# Patient Record
Sex: FEMALE | Race: ASIAN
[De-identification: names, ages, dates, MRNs, and addresses within clinical notes are randomized per-mention and may not be internally consistent; named-entity substitution may affect disease eponyms.]

---

## 2020-08-29 ENCOUNTER — HOSPITAL ENCOUNTER (EMERGENCY)
Dept: HOSPITAL 72 - EMR | Age: 25
Discharge: HOME | End: 2020-08-29
Payer: MEDICAID

## 2020-08-29 VITALS — SYSTOLIC BLOOD PRESSURE: 132 MMHG | DIASTOLIC BLOOD PRESSURE: 78 MMHG

## 2020-08-29 VITALS — BODY MASS INDEX: 44.2 KG/M2 | HEIGHT: 66 IN | WEIGHT: 275 LBS

## 2020-08-29 VITALS — SYSTOLIC BLOOD PRESSURE: 135 MMHG | DIASTOLIC BLOOD PRESSURE: 84 MMHG

## 2020-08-29 DIAGNOSIS — N39.0: Primary | ICD-10-CM

## 2020-08-29 DIAGNOSIS — N83.202: ICD-10-CM

## 2020-08-29 DIAGNOSIS — E66.9: ICD-10-CM

## 2020-08-29 DIAGNOSIS — D50.9: ICD-10-CM

## 2020-08-29 DIAGNOSIS — D72.829: ICD-10-CM

## 2020-08-29 DIAGNOSIS — Z90.49: ICD-10-CM

## 2020-08-29 LAB
ADD MANUAL DIFF: NO
ALBUMIN SERPL-MCNC: 3.4 G/DL (ref 3.4–5)
ALBUMIN/GLOB SERPL: 0.8 {RATIO} (ref 1–2.7)
ALP SERPL-CCNC: 69 U/L (ref 46–116)
ALT SERPL-CCNC: 22 U/L (ref 12–78)
ANION GAP SERPL CALC-SCNC: 9 MMOL/L (ref 5–15)
APPEARANCE UR: CLEAR
APTT PPP: (no result) S
AST SERPL-CCNC: 15 U/L (ref 15–37)
BASOPHILS NFR BLD AUTO: 0.6 % (ref 0–2)
BILIRUB SERPL-MCNC: 0.1 MG/DL (ref 0.2–1)
BUN SERPL-MCNC: 12 MG/DL (ref 7–18)
CALCIUM SERPL-MCNC: 8.9 MG/DL (ref 8.5–10.1)
CHLORIDE SERPL-SCNC: 104 MMOL/L (ref 98–107)
CO2 SERPL-SCNC: 25 MMOL/L (ref 21–32)
CREAT SERPL-MCNC: 0.6 MG/DL (ref 0.55–1.3)
EOSINOPHIL NFR BLD AUTO: 2.2 % (ref 0–3)
ERYTHROCYTE [DISTWIDTH] IN BLOOD BY AUTOMATED COUNT: 14.3 % (ref 11.6–14.8)
GLOBULIN SER-MCNC: 4.3 G/DL
GLUCOSE UR STRIP-MCNC: NEGATIVE MG/DL
HCT VFR BLD CALC: 36.4 % (ref 37–47)
HGB BLD-MCNC: 11.7 G/DL (ref 12–16)
KETONES UR QL STRIP: NEGATIVE
LEUKOCYTE ESTERASE UR QL STRIP: (no result)
LYMPHOCYTES NFR BLD AUTO: 24.8 % (ref 20–45)
MCV RBC AUTO: 79 FL (ref 80–99)
MONOCYTES NFR BLD AUTO: 6.3 % (ref 1–10)
NEUTROPHILS NFR BLD AUTO: 66.1 % (ref 45–75)
NITRITE UR QL STRIP: NEGATIVE
PH UR STRIP: 8 [PH] (ref 4.5–8)
PLATELET # BLD: 350 K/UL (ref 150–450)
POTASSIUM SERPL-SCNC: 3.9 MMOL/L (ref 3.5–5.1)
PROT UR QL STRIP: NEGATIVE
RBC # BLD AUTO: 4.61 M/UL (ref 4.2–5.4)
SODIUM SERPL-SCNC: 138 MMOL/L (ref 136–145)
SP GR UR STRIP: 1.01 (ref 1–1.03)
UROBILINOGEN UR-MCNC: NORMAL MG/DL (ref 0–1)
WBC # BLD AUTO: 11.6 K/UL (ref 4.8–10.8)

## 2020-08-29 PROCEDURE — 85025 COMPLETE CBC W/AUTO DIFF WBC: CPT

## 2020-08-29 PROCEDURE — 36415 COLL VENOUS BLD VENIPUNCTURE: CPT

## 2020-08-29 PROCEDURE — 84702 CHORIONIC GONADOTROPIN TEST: CPT

## 2020-08-29 PROCEDURE — 80053 COMPREHEN METABOLIC PANEL: CPT

## 2020-08-29 PROCEDURE — 83690 ASSAY OF LIPASE: CPT

## 2020-08-29 PROCEDURE — 81025 URINE PREGNANCY TEST: CPT

## 2020-08-29 PROCEDURE — 86850 RBC ANTIBODY SCREEN: CPT

## 2020-08-29 PROCEDURE — 76801 OB US < 14 WKS SINGLE FETUS: CPT

## 2020-08-29 PROCEDURE — 99284 EMERGENCY DEPT VISIT MOD MDM: CPT

## 2020-08-29 PROCEDURE — 86900 BLOOD TYPING SEROLOGIC ABO: CPT

## 2020-08-29 PROCEDURE — 86901 BLOOD TYPING SEROLOGIC RH(D): CPT

## 2020-08-29 PROCEDURE — 81003 URINALYSIS AUTO W/O SCOPE: CPT

## 2020-08-29 PROCEDURE — 76817 TRANSVAGINAL US OBSTETRIC: CPT

## 2020-08-29 NOTE — NUR
ER DISCHARGE NOTE:



Patient is cleared to be discharged per ERMD. Pt is aox4, on room air, with 
stable vital signs. Pt was given dc and prescription instructions. Pt was able 
to verbalize understanding; instructed pt to follow up with primary care 
physican within one week. Pt id band and iv site removed without complications; 
IV site clean and bandaged. Pt is able to ambulate with steady gait. Pt took 
all belongings.

## 2020-08-29 NOTE — NUR
Nurse Note:



Pt able to tolerate oral fluids. US at bedside; all safety measures met; will 
continue to montior.

## 2020-08-29 NOTE — DIAGNOSTIC IMAGING REPORT
EXAM:

  US First Trimester Pregnancy, Transabdominal and Transvaginal

 

CLINICAL HISTORY:

  Ovarian cysts. Pelvic pain. Positive pregnancy. LMP 7/26/2020.

 

TECHNIQUE:

  Real-time transabdominal and transvaginal obstetrical ultrasound of the 

maternal pelvis and a first trimester pregnancy with image documentation. 

 Transvaginal imaging was used for better evaluation of the fetus and 

adnexa.

 

COMPARISON:

  No relevant prior studies available.

 

FINDINGS:

  

No evidence of intrauterine pregnancy.  Not an unexpected finding given a 

beta hCG at 949.

Endometrial thickening measuring up to 14.4 mm.  Probable functional cyst 

left ovary.  Small to moderate amount of free fluid in the dependent 

pelvis.  Ectopic pregnancy cannot be excluded in the absence of 

identifying a normal IUP.  Recommend serial follow-up beta hCG.

## 2020-08-29 NOTE — NUR
ED Nurse Note:



Pt ambulated to ED d/t abdominal pain 7/10 since yesterday. Pt had 2 positive 
and 2 neg pregnancy tests since yesterday. No NVD. Want to have US. Pt is AOx4, 
calm and cooperative, VSS, on RA, afebrile on triage. Placed on bed.

## 2020-08-29 NOTE — EMERGENCY ROOM REPORT
History of Present Illness


General


Chief Complaint:  Female Urogenital Problems


Source:  Patient





Present Illness


HPI


Patient is  25-year-old female with PMHx L ovarian cyst presents with chief 

complaint of bilateral lower quadrant abdominal pain since yesterday. 


She was told to go to the ER from an urgent care for further evaluation. 


Patient's last mental period was .  She had her IUD removed 1 week prior 

to her LMP by because she is trying to become pregnant.  


She took several pregnancy tests yesterday which gave her different results.  2 

were positive, 2 were negative.  


She denies fever, flank pain, vaginal bleeding, nausea, vomiting, diarrhea, 

chest pain, shortness of breath, hemoptysis, leg swelling, weakness, melena, 

hematochezia, dysuria, vaginal discharge or concern for sexually transmitted 

infection.


She reports normal diet and normal bowel movements


The patient's symptoms were gradual onset, severity was moderate, duration 

since 2 days.  


Quality: Aching


Has not tried any medication to alleviate her symptoms.  








Past medical history: L ovarian cyst


Past surgical history:  Cholecystectomy 2019





Smoking:  Denies


Alcohol use:  Denies


Drug use:  Denies





Review of systems:


CONST: No fevers or chills, No night sweats


PULMONARY: No productive cough,  No shortness of breath 


CARDIAC: No chest pain, No palpitations 


GI: No vomiting, No diarrhea , No melena_or_BRBPR 


: No dysuria, No hematuria, No discharge 


NEURO: No new_focal_weakness_or_numbness, No confusion, No vision changes


14 point Review of Systems is otherwise negative except per HPI








Physical Exam:


GENERAL: Awake_alert_ nontoxic, no acute distress Spo2 97% on RA -normal. 

Obese. 


EYES: Extraocular muscles are intact. Conjunctivae clear. Lids without swelling


ENT: External nose and ear normal_in_appearance. Oropharynx clear. Head_

atraumatic, Moist_oral_mucosa


NECK:  No JVD. No meningismus. No thyromegaly.  Supple. Trachea midline


RESP: Normal respiratory effort. Symmetric rise. No stridor. Clear_to_

auscultation_No_rales_No_wheezes


No CVA TTP. 


CARDIAC: Regular rate and regular rhythm. No_significant pedal edema.


ABDOMEN: Soft. Nondistended.  Nontender_No_rebound_or_guarding.


MSK:  Normal muscle tone, without rigidity.  Extremities without asymmetric 

deformity or swelling.  


SKIN: Warm and dry.  No visible cyanosis or pallor


NEUROLOGIC: Alert, oriented x3.  Motor_and_sensation_grossly_intact. No truncal 

ataxia. Gait_normal


Psych: Normal mood and affect, normal judgment and insight








- COORDINATION OF CARE


Case was discussed with: Patient  


Any labs and imaging that were ordered were interpreted as part of the medical 

decision making:





Medical Decision Making/Plan:





Differential diagnosis includes UTI, pregnancy, ectopic pregnancy, 

diverticulitis, ovarian torsion, hemorrhagic ovarian cyst, PID (pelvic 

inflammatory disease), kidney stone, UTI


DOUBT appendicitis, small bowel obstruction, volvulus, AAA, pancreatitis, among 

others.  





Patient is well-appearing with stable vital signs. Abdominal exam is non 

peritoneal with no guarding or rebound. No flank pain or ecchymosis.





Labs show leukocytosis of 11.6.  Also has mild urinary tract infection.  No 

evidence of pyelonephritis or SHAUN. Doubt retained/infected kidney stone. She is 

incidentally noted to have mild microcytic anemia. She denies vaginal bleed, GI 

bleed, or any kind of hemorrhage. No acute indication for blood transfusion at 

this time.


Beta HCG is 929, which is low for her stated EGA of 5 weeks. (LMP was ) 

Rh is positive. No indication for rhogam. 





Vaginal Ultra-Sound shows no IUP. No ovarian torsion or TOA. +L ovarian cyst.





Due to concern for ectopic pregnancy, Dr Parker Bang was consulted from OB/GYN.


He reviewed patient's lab and ultrasound imaging. At this time, states it may 

be too early to definitively diagnose an ectopic and since patient is stable 

and risk of ectopic rupture is low, that patient can be managed with close 

follow up. 


----He recommends follow-up bHCG, progesterone level, and vaginal ultrasound in 

48 hrs to evaluate for any change.


----Does not recommend starting methotrexate right now because it can be too 

early to tell for sure if there is an ectopic.





ED intervention:  First dose of keflex for UTI





Patient was counseled and educated at length regarding her diagnosis. I 

educated her on the potential for ectopic pregnancy, and also about the low 

likelihood that she has a ruptured ectopic at this time. She demonstrates the 

capacity to understand her diagnosis and the need for prompt follow up within 24

-48 hrs to confirm the diagnosis. 


She has a PMD assigned on her health net card which she states she can call for 

close follow-up, but I have instructed her to return to ED for repeat labs if 

she is unable to get an appointment on Monday. She verbalizes that she will 

come into ED for re-eval for bHCG and progesterone level after she gets off at 

work (works at /)





The patients labs and serial abdominal exams were reassuring, without any 

peritoneal signs.


Over the course of their emergency department stay, serial abdominal exams were 

performed.  


The patients symptoms significantly improved, exam upon discharge revealed a 

benign abdomen, and tolerating oral fluids.  








Pertinent results reviewed with the patient. I educated the patient on the 

current treatment plan including the risks, benefits, and alternatives. I also 

discussed the extent and limitations of the current evaluation. The patient 

expressed understanding and agreement with plan. I recommended PMD/ER follow-up 

within 1-2 days. Also advised that the patient return to the Emergency 

Department as soon as possible if they experience any new, persistent, or 

worsening symptoms. Strict return precautions for ruptured ectopic discussed 

including severe abdominal pain, vaginal bleeding, syncope, flank pain or other 

symptoms. 


Patient states she will call 911 in case any of these red flag symptoms arise.


Allergies:  


Coded Allergies:  


     Prune (Verified  Allergy, Unknown, 20)





COVID-19 Screening


Contact w/high risk pt:  No


Experienced COVID-19 symptoms?:  No


COVID-19 Testing performed PTA:  No





Patient History


Last Menstrual Period:  


:  2


Para:  2





Nursing Documentation-PMH


Past Medical History:  No History, Except For


Hx COPD:  No - EXZEMA


Hx Gastrointestinal Problems:  Yes - CHOLECYSTECOMY, OVARIAN CYSTS





Physical Exam





Vital Signs








  Date Time  Temp Pulse Resp B/P (MAP) Pulse Ox O2 Delivery O2 Flow Rate FiO2


 


20 18:39 98.2 85 16 135/84 (101) 97 Room Air  








Sp02 EP Interpretation:  reviewed, normal





Medical Decision Making


Diagnostic Impression:  


 Primary Impression:  


 Abdominal pain


 Additional Impressions:  


 Pregnancy


 UTI (urinary tract infection)


 Left ovarian cyst





CT/MRI/US Diagnostic Results


CT/MRI/US Diagnostic Results :  


   Impression


US OB 1st Trimester Gestation


EXAM:


  US First Trimester Pregnancy, Transabdominal and Transvaginal


CLINICAL HISTORY:


  Ovarian cysts. Pelvic pain. Positive pregnancy. LMP 2020.


FINDINGS:


No evidence of intrauterine pregnancy.  Not an unexpected finding given a 


beta hCG at 949.


Endometrial thickening measuring up to 14.4 mm.  Probable functional cyst 


left ovary.  Small to moderate amount of free fluid in the dependent 


pelvis.  Ectopic pregnancy cannot be excluded in the absence of 


identifying a normal IUP.  Recommend serial follow-up beta hCG.


 


Dictated By: Stephon Bello DO





Last Vital Signs








  Date Time  Temp Pulse Resp B/P (MAP) Pulse Ox O2 Delivery O2 Flow Rate FiO2


 


20 18:47 98.2  16 135/84 97 Room Air  


 


20 18:39  85      








Disposition:  HOME, SELF-CARE


Admit Decision Time:  19:14


Condition:  Stable


Scripts


Acetaminophen* (TYLENOL EXTRA STRENGTH*) 500 Mg Tablet


500 MG ORAL Q8H PRN for Prn Headache/Temp > 101, #20 TAB 0 Refills


   Prov: Sherry Peck D.O.         20 


Cephalexin* (KEFLEX*) 500 Mg Capsule


500 MG ORAL EVERY 12 HOURS, #14 CAP 0 Refills


   Prov: Sherry Peck D.O.         20


Patient Instructions:  Abdominal Pain During Pregnancy, Easy-to-Read, Urinary 

Tract Infection





Additional Instructions:  








Instructions for patient/caretaker:


Follow up with your physician in 1 to 2 days for repeat abdominal examination.  

You are found to have a urinary tract infection on your urinalysis..


Please proceed to OB/GYN for regular prenatal care.


YOU MAY HAVE A POTENTIAL ECTOPIC PREGNANCY BUT IT IS TOO EARLY TO TELL SO YOU 

NEED TO RETURN TO ER IN 24-48 HOURS FOR REPEAT LABS AND ULTRASOUND.


Follow-up with your doctor sooner if your condition requires a more timely 

clinical reevaluation.


Return to the emergency department OR CALL 911 immediately if you feel that 

your condition is worsening or if you have any new or concerning symptoms.


Review your discharge instructions and take any prescriptions given as 

instructed.











Sherry Peck D.O. Aug 29, 2020 19:14

## 2020-08-31 ENCOUNTER — HOSPITAL ENCOUNTER (EMERGENCY)
Dept: HOSPITAL 72 - EMR | Age: 25
Discharge: HOME | End: 2020-08-31
Payer: MEDICAID

## 2020-08-31 VITALS — SYSTOLIC BLOOD PRESSURE: 132 MMHG | DIASTOLIC BLOOD PRESSURE: 85 MMHG

## 2020-08-31 VITALS — HEIGHT: 66 IN | BODY MASS INDEX: 44.2 KG/M2 | WEIGHT: 275 LBS

## 2020-08-31 DIAGNOSIS — O00.90: Primary | ICD-10-CM

## 2020-08-31 DIAGNOSIS — R10.9: ICD-10-CM

## 2020-08-31 LAB
ADD MANUAL DIFF: NO
ALBUMIN SERPL-MCNC: 3.4 G/DL (ref 3.4–5)
ALBUMIN/GLOB SERPL: 0.9 {RATIO} (ref 1–2.7)
ALP SERPL-CCNC: 73 U/L (ref 46–116)
ALT SERPL-CCNC: 21 U/L (ref 12–78)
ANION GAP SERPL CALC-SCNC: 9 MMOL/L (ref 5–15)
APPEARANCE UR: (no result)
APTT PPP: YELLOW S
AST SERPL-CCNC: 17 U/L (ref 15–37)
BASOPHILS NFR BLD AUTO: 0.7 % (ref 0–2)
BILIRUB SERPL-MCNC: 0.2 MG/DL (ref 0.2–1)
BUN SERPL-MCNC: 10 MG/DL (ref 7–18)
CALCIUM SERPL-MCNC: 8.6 MG/DL (ref 8.5–10.1)
CHLORIDE SERPL-SCNC: 103 MMOL/L (ref 98–107)
CO2 SERPL-SCNC: 26 MMOL/L (ref 21–32)
CREAT SERPL-MCNC: 0.9 MG/DL (ref 0.55–1.3)
EOSINOPHIL NFR BLD AUTO: 3.1 % (ref 0–3)
ERYTHROCYTE [DISTWIDTH] IN BLOOD BY AUTOMATED COUNT: 14.3 % (ref 11.6–14.8)
GLOBULIN SER-MCNC: 4 G/DL
GLUCOSE UR STRIP-MCNC: NEGATIVE MG/DL
HCT VFR BLD CALC: 34.6 % (ref 37–47)
HGB BLD-MCNC: 11.1 G/DL (ref 12–16)
KETONES UR QL STRIP: (no result)
LEUKOCYTE ESTERASE UR QL STRIP: (no result)
LYMPHOCYTES NFR BLD AUTO: 27 % (ref 20–45)
MCV RBC AUTO: 79 FL (ref 80–99)
MONOCYTES NFR BLD AUTO: 6.4 % (ref 1–10)
NEUTROPHILS NFR BLD AUTO: 62.9 % (ref 45–75)
NITRITE UR QL STRIP: NEGATIVE
PH UR STRIP: 6 [PH] (ref 4.5–8)
PLATELET # BLD: 339 K/UL (ref 150–450)
POTASSIUM SERPL-SCNC: 4 MMOL/L (ref 3.5–5.1)
PROT UR QL STRIP: (no result)
RBC # BLD AUTO: 4.35 M/UL (ref 4.2–5.4)
SODIUM SERPL-SCNC: 138 MMOL/L (ref 136–145)
SP GR UR STRIP: 1.02 (ref 1–1.03)
UROBILINOGEN UR-MCNC: NORMAL MG/DL (ref 0–1)
WBC # BLD AUTO: 10.1 K/UL (ref 4.8–10.8)

## 2020-08-31 PROCEDURE — 76817 TRANSVAGINAL US OBSTETRIC: CPT

## 2020-08-31 PROCEDURE — 83690 ASSAY OF LIPASE: CPT

## 2020-08-31 PROCEDURE — 81003 URINALYSIS AUTO W/O SCOPE: CPT

## 2020-08-31 PROCEDURE — 76801 OB US < 14 WKS SINGLE FETUS: CPT

## 2020-08-31 PROCEDURE — 36415 COLL VENOUS BLD VENIPUNCTURE: CPT

## 2020-08-31 PROCEDURE — 85025 COMPLETE CBC W/AUTO DIFF WBC: CPT

## 2020-08-31 PROCEDURE — 99284 EMERGENCY DEPT VISIT MOD MDM: CPT

## 2020-08-31 PROCEDURE — 87086 URINE CULTURE/COLONY COUNT: CPT

## 2020-08-31 PROCEDURE — 80053 COMPREHEN METABOLIC PANEL: CPT

## 2020-08-31 PROCEDURE — 84702 CHORIONIC GONADOTROPIN TEST: CPT

## 2020-08-31 NOTE — DIAGNOSTIC IMAGING REPORT
EXAM:

  US First Trimester Pregnancy, Transabdominal

 

CLINICAL HISTORY:

  PAIN

 

TECHNIQUE:

  Real-time transabdominal obstetrical ultrasound of the maternal pelvis 

and a first trimester pregnancy with image documentation.

 

COMPARISON:

  No relevant prior studies available.

 

FINDINGS:

  Uterus:  Uterus measures 9.8 x 4.9 x 5.9 cm.  Thickened endometrium 

measures up to 22 mm. Similar small cystic structure and adjacent complex 

fluid in the endometrium measuring up to 4 mm.

  Adnexa:  Complex 4.8 cm right adnexal mass.  Complex 3.9 cm left 

adnexal structure appears separate from the left ovary.

  Free fluid:  Trace pelvic free fluid.

 

IMPRESSION:   

1.  1.  An intrauterine pregnancy is not identified.

2.  2.  Indeterminate complex bilateral adnexal masses measuring 4.8 cm 

in the right and 3.9 cm on the left.  Ectopic pregnancy is not excluded.  

Short interval follow-up and serial serum beta hCGs are recommended.

## 2020-08-31 NOTE — NUR
ED Nurse Note:

Pt ambulated to ED from home c/o abdominal pain for 3 days, pt is 5 weeks 
pregnant, denies vaginal bleeding. Pt is A&Ox4, VSS, pt was told by her OB to 
come to ER if pain persists

## 2020-08-31 NOTE — NUR
ED Nurse Note:

Per Dr Odonnell, give 150mg Methotrexate IM once for ectopic pregnancy, no 
medicaton order found due to pharmacy being closed, CHarge RN and ERMD aware. 
Pt tolerated well

## 2020-08-31 NOTE — DIAGNOSTIC IMAGING REPORT
EXAM:

  US Pregnancy, Limited

 

CLINICAL HISTORY:

  PAIN

 

TECHNIQUE:

  Real-time limited ultrasound of the pregnant maternal uterus with image 

documentation.

 

COMPARISON:

  08/29/20

 

FINDINGS:

  Uterus:  Uterus measures 9.8 x 4.9 x 5.9 cm.  Thickened endometrium 

measures up to 22 mm. Similar small cystic structure and adjacent complex 

fluid in the endometrium measuring up to 4 mm.

  Adnexa:  Complex 4.8 cm right adnexal mass.  Complex 3.9 cm left 

adnexal structure appears separate from the left ovary.

  Free fluid:  Trace pelvic free fluid.

 

IMPRESSION:     

1.  An intrauterine pregnancy is not identified.

2.  Indeterminate complex bilateral adnexal masses measuring 4.8 cm in 

the right and 3.9 cm on the left.  Ectopic pregnancy is not excluded.  

Short interval follow-up and serial serum beta hCGs are recommended.

## 2020-08-31 NOTE — EMERGENCY ROOM REPORT
History of Present Illness


General


Chief Complaint:  Pregnancy Complications


Source:  Patient





Present Illness


HPI


25-year-old G3, P2 here with abdominal pain.  The patient was here 2 days ago 

with similar complaints and she had a pelvic ultrasound which did not show an 

IUP.  Patient was told to come back to the emergency department in 2 days time 

for a repeat ultrasound and repeat quantitative hCG.  Patient says that her 

abdominal pain has continued and has slightly worsened.  Is located diffusely 

in the abdomen and poorly localized and radiates to the bilateral flanks.  No 

fevers, chills, chest pain, palpitations, shortness of breath, nausea, vomiting

, diarrhea, dysuria, vaginal discharge, vaginal bleeding.


Allergies:  


Coded Allergies:  


     Prune (Verified  Allergy, Unknown, 20)





COVID-19 Screening


Contact w/high risk pt:  No


Experienced COVID-19 symptoms?:  No


COVID-19 Testing performed PTA:  No





Patient History


Last Menstrual Period:  20


Pregnant Now:  Yes - 5 weeks


:  3


Para:  2





Nursing Documentation-[facility]


Past Medical History:  No History, Except For


Hx COPD:  No - eczema


Hx Gastrointestinal Problems:  Yes - CHOLECYSTECOMY, OVARIAN CYSTS





Review of Systems


All Other Systems:  negative except mentioned in HPI





Physical Exam





Vital Signs








  Date Time  Temp Pulse Resp B/P (MAP) Pulse Ox O2 Delivery O2 Flow Rate FiO2


 


20 19:06 98.1 91 17 128/81 (97) 98 Room Air  








Sp02 EP Interpretation:  reviewed, normal


General Appearance:  no apparent distress, alert, non-toxic


Head:  normocephalic, atraumatic


Eyes:  bilateral eye normal inspection, bilateral eye PERRL


ENT:  hearing grossly normal, normal pharynx, no angioedema, normal voice


Neck:  full range of motion, supple/symm/no masses


Respiratory:  chest non-tender, lungs clear, normal breath sounds, speaking 

full sentences


Cardiovascular #1:  regular rate, rhythm, no edema


Cardiovascular #2:  2+ carotid (R), 2+ carotid (L), 2+ radial (R), 2+ radial (L)

, 2+ dorsalis pedis (R), 2+ dorsalis pedis (L)


Gastrointestinal:  normal bowel sounds, non tender, soft, non-distended, no 

guarding, no rebound


Rectal:  deferred


Genitourinary:  normal inspection, no CVA tenderness


Musculoskeletal:  back normal, normal range of motion, gait/station normal, non-

tender


Neurologic:  alert, motor strength/tone normal, sensory intact, responsive, 

speech normal


Psychiatric:  judgement/insight normal, memory normal, mood/affect normal, no 

suicidal/homicidal ideation


Lymphatic:  no adenopathy





Medical Decision Making


Diagnostic Impression:  


 Primary Impression:  


 Ectopic pregnancy


 Additional Impression:  


 Abdominal pain


ER Course





Laboratory Tests








Test


  20


19:15 20


19:40


 


Urine Color Yellow   


 


Urine Appearance


  Slightly


cloudy 


 


 


Urine pH 6 (4.5-8.0)   


 


Urine Specific Gravity


  1.020


(1.005-1.035) 


 


 


Urine Protein


  1+ (NEGATIVE)


H 


 


 


Urine Glucose (UA)


  Negative


(NEGATIVE) 


 


 


Urine Ketones


  3+ (NEGATIVE)


H 


 


 


Urine Blood


  1+ (NEGATIVE)


H 


 


 


Urine Nitrite


  Negative


(NEGATIVE) 


 


 


Urine Bilirubin


  Negative


(NEGATIVE) 


 


 


Urine Urobilinogen


  Normal MG/DL


(0.0-1.0) 


 


 


Urine Leukocyte Esterase


  3+ (NEGATIVE)


H 


 


 


Urine RBC


  10-15 /HPF (0


- 2)  H 


 


 


Urine WBC


  30-40 /HPF (0


- 2)  H 


 


 


Urine Squamous Epithelial


Cells Many /LPF


(NONE/OCC)  H 


 


 


Urine Bacteria


  Many /HPF


(NONE)  H 


 


 


White Blood Count


  


  10.1 K/UL


(4.8-10.8)


 


Red Blood Count


  


  4.35 M/UL


(4.20-5.40)


 


Hemoglobin


  


  11.1 G/DL


(12.0-16.0)  L


 


Hematocrit


  


  34.6 %


(37.0-47.0)  L


 


Mean Corpuscular Volume


  


  79 FL (80-99)


L


 


Mean Corpuscular Hemoglobin


  


  25.4 PG


(27.0-31.0)  L


 


Mean Corpuscular Hemoglobin


Concent 


  32.0 G/DL


(32.0-36.0)


 


Red Cell Distribution Width


  


  14.3 %


(11.6-14.8)


 


Platelet Count


  


  339 K/UL


(150-450)


 


Mean Platelet Volume


  


  5.3 FL


(6.5-10.1)  L


 


Neutrophils (%) (Auto)


  


  62.9 %


(45.0-75.0)


 


Lymphocytes (%) (Auto)


  


  27.0 %


(20.0-45.0)


 


Monocytes (%) (Auto)


  


  6.4 %


(1.0-10.0)


 


Eosinophils (%) (Auto)


  


  3.1 %


(0.0-3.0)  H


 


Basophils (%) (Auto)


  


  0.7 %


(0.0-2.0)


 


Sodium Level


  


  138 MMOL/L


(136-145)


 


Potassium Level


  


  4.0 MMOL/L


(3.5-5.1)


 


Chloride Level


  


  103 MMOL/L


()


 


Carbon Dioxide Level


  


  26 MMOL/L


(21-32)


 


Anion Gap


  


  9 mmol/L


(5-15)


 


Blood Urea Nitrogen


  


  10 mg/dL


(7-18)


 


Creatinine


  


  0.9 MG/DL


(0.55-1.30)


 


Estimated Glomerular


Filtration Rate 


  > 60 mL/min


(>60)


 


Glucose Level


  


  84 MG/DL


()


 


Calcium Level


  


  8.6 MG/DL


(8.5-10.1)


 


Total Bilirubin


  


  0.2 MG/DL


(0.2-1.0)


 


Aspartate Amino Transferase


(AST) 


  17 U/L (15-37)


 


 


Alanine Aminotransferase (ALT)


  


  21 U/L (12-78)


 


 


Alkaline Phosphatase


  


  73 U/L


()


 


Total Protein


  


  7.4 G/DL


(6.4-8.2)


 


Albumin


  


  3.4 G/DL


(3.4-5.0)


 


Globulin  4.0 g/dL  


 


Albumin/Globulin Ratio


  


  0.9 (1.0-2.7)


L


 


Lipase


  


  104 U/L


()


 


Human Chorionic Gonadotropin,


Quant 


  2583 mIU/mL


(1-6)  H





Pelvic ultrasound: No intrauterine pregnancy identified.  Indeterminate complex 

bilateral adnexal masses measuring 4.8 cm in the right and three-point 

centimeters on the left.  External pregnancy not excluded.





25-year-old female here with abdominal pain.  Patient is G3, P2 and was here 2 

days ago with abdominal pain and had an indeterminate pelvic ultrasound.  At 

that time hCG was 130.  hCG today is 2600.  Pelvic ultrasound did not reveal 

any IUP.  However there were bilateral adnexal masses four-point centimeter on 

the right and 3.9 cm on the left.  I spoke with the OB/GYN physician Dr. Barrera 

who agreed that this appears to be an ectopic pregnancy.  He also agreed that 

the patient is an ideal candidate for methotrexate treatment.  I explained 

these findings with the patient who expressed understanding and agreed to 

methotrexate treatment.  There is no methotrexate available at the hospital.  

Currently attempting to obtain methotrexate.  [facility]





Last Vital Signs








  Date Time  Temp Pulse Resp B/P (MAP) Pulse Ox O2 Delivery O2 Flow Rate FiO2


 


20 19:06 98.1 91 17 128/81 (97) 98 Room Air  








Referrals:  


[facility]











Gurpreet Barker M.D. Aug 31, 2020 19:33